# Patient Record
Sex: MALE | Race: WHITE | NOT HISPANIC OR LATINO | URBAN - METROPOLITAN AREA
[De-identification: names, ages, dates, MRNs, and addresses within clinical notes are randomized per-mention and may not be internally consistent; named-entity substitution may affect disease eponyms.]

---

## 2021-02-24 ENCOUNTER — EMERGENCY (EMERGENCY)
Facility: HOSPITAL | Age: 46
LOS: 1 days | Discharge: HOME | End: 2021-02-24
Attending: EMERGENCY MEDICINE | Admitting: EMERGENCY MEDICINE
Payer: COMMERCIAL

## 2021-02-24 VITALS
HEART RATE: 58 BPM | SYSTOLIC BLOOD PRESSURE: 126 MMHG | TEMPERATURE: 98 F | RESPIRATION RATE: 17 BRPM | OXYGEN SATURATION: 95 % | DIASTOLIC BLOOD PRESSURE: 71 MMHG

## 2021-02-24 VITALS
HEART RATE: 112 BPM | TEMPERATURE: 98 F | RESPIRATION RATE: 16 BRPM | SYSTOLIC BLOOD PRESSURE: 140 MMHG | DIASTOLIC BLOOD PRESSURE: 77 MMHG | OXYGEN SATURATION: 98 %

## 2021-02-24 PROCEDURE — 96374 THER/PROPH/DIAG INJ IV PUSH: CPT

## 2021-02-24 PROCEDURE — 73080 X-RAY EXAM OF ELBOW: CPT

## 2021-02-24 PROCEDURE — 73060 X-RAY EXAM OF HUMERUS: CPT

## 2021-02-24 PROCEDURE — 73030 X-RAY EXAM OF SHOULDER: CPT

## 2021-02-24 PROCEDURE — 99285 EMERGENCY DEPT VISIT HI MDM: CPT | Mod: 25

## 2021-02-24 PROCEDURE — 99284 EMERGENCY DEPT VISIT MOD MDM: CPT

## 2021-02-24 PROCEDURE — 73030 X-RAY EXAM OF SHOULDER: CPT | Mod: 26,RT,76

## 2021-02-24 PROCEDURE — 23620 CLTX GR HMRL TBRS FX WO MNPJ: CPT | Mod: RT

## 2021-02-24 PROCEDURE — 73080 X-RAY EXAM OF ELBOW: CPT | Mod: 26,RT

## 2021-02-24 PROCEDURE — 73060 X-RAY EXAM OF HUMERUS: CPT | Mod: 26,RT

## 2021-02-24 RX ORDER — HYDROMORPHONE HYDROCHLORIDE 2 MG/ML
0.5 INJECTION INTRAMUSCULAR; INTRAVENOUS; SUBCUTANEOUS ONCE
Refills: 0 | Status: DISCONTINUED | OUTPATIENT
Start: 2021-02-24 | End: 2021-02-24

## 2021-02-24 RX ADMIN — HYDROMORPHONE HYDROCHLORIDE 0.5 MILLIGRAM(S): 2 INJECTION INTRAMUSCULAR; INTRAVENOUS; SUBCUTANEOUS at 12:41

## 2021-02-24 NOTE — CONSULT NOTE ADULT - ASSESSMENT
A/P: 46M w/ R Shoulder Fracture Dislocation  s/p Closed reduction  Analgesia  NWB RUE  Sling for comfort  PT/OT  Ice as tolerated  No acute orthopedic surgical intervention indicated at this time  Orthopedically stable  FU outpatient with orthopedics, Dr. Hays within 1 week. Call office to schedule appointment.  Discussed with orthopedic attending, Dr. Hays who is aware and in agreement with above plan

## 2021-02-24 NOTE — ED PROVIDER NOTE - PHYSICAL EXAMINATION
Constitutional: Awake, Alert, non-toxic. NAD. Well appearing, well nourished.   HEAD: Normocephalic, atraumatic.   EYES: PERRL, EOM intact, conjunctiva and sclera are clear bilaterally. No raccoon eyes.   ENT: No victor sign. No rhinorrhea, normal pharynx, patent, no tonsillar exudate or enlargement, mucous membranes pink/moist, no erythema, no drooling or stridor.   NECK: Supple, non-tender  BACK: No midline or paraspinal TTP of cervical/thoracic/lumbar spine, FROM. No ecchymosis or hematomas.   CARDIOVASCULAR: Normal S1, S2; regular rate and rhythm.  RESPIRATORY: Normal respiratory effort; breath sounds CTAB, no wheezes, rhonchi, or rales. Speaking in full sentences. No accessory muscle use.   ABDOMEN: Soft; non-tender, non-distended.  EXTREMITIES: (+) left shoulder limited ROM, (+) left shoulder TTP, no wrist drop, radial pulse intact, hand  intact; distal pulses palpable and symmetric, no edema, no crepitus or step off  SKIN: Warm, dry; good skin turgor, no apparent lesions or rashes, no ecchymosis, brisk capillary refill.  NEURO: A&O x3. Sensory and motor functions are grossly intact. Speech is normal. Appearance and judgement seem appropriate for gender and age. No neurological deficits. Neurovascular sensation intact motor function 5/5 of upper and lower extremities, CN II-XII grossly intact, no ataxia, absent pronator drift, intact cerebellar function. No nystagmus. No facial droop.

## 2021-02-24 NOTE — ED PROVIDER NOTE - PATIENT PORTAL LINK FT
You can access the FollowMyHealth Patient Portal offered by [facility] by registering at the following website: [email or web address]. By joining [employer]’s FollowMyHealth portal, you will also be able to view your health information using other applications (apps) compatible with our system.

## 2021-02-24 NOTE — ED ADULT NURSE NOTE - OBJECTIVE STATEMENT
Pt received in bed alert and oriented and resting in bed with the c/o slip and fall off truck. Pt has c/o pain in right shoulder. As per MD's orders pt prepped for xrays and IV venkatesh placed in left AC. Pt stable and tolerate well. Nursing care ongoing and safety maintained.

## 2021-02-24 NOTE — ED PROVIDER NOTE - CARE PROVIDER_API CALL
Cayetano Hays (DO)  Orthopaedic Surgery Surgery  [address] [facility], [address] [place] [address]  Phone: [phone]  Fax: [phone]  Follow Up Time: 1-3 Days

## 2021-02-24 NOTE — CONSULT NOTE ADULT - SUBJECTIVE AND OBJECTIVE BOX
46yMale RHD community ambulator without assistive devices who presents to [facility] w/ a c/o R shoulder pain  s/p mechanical fall at work. Pt states he used his R arm to catch his fall when he felt a pop and severe pain in his R shoulder Patient denies head hit or LOC. Patient admits to numbness/tingling in his 4th and 5th fingers but states this is chronic. Patient denies any other numbness or tingling in the RUE. Patient denies any other injuries. Denies any previous orthopedic history.    ROS: 10 point review of systems otherwise negative unless noted in HPI    PMH:  No pertinent past medical history      PSH:  No significant past surgical history      AH:    Meds: See med rec    T(C): 36.7 (02-24-21 @ 10:30)  HR: 112 (02-24-21 @ 10:30)  BP: 140/77 (02-24-21 @ 10:30)  RR: 16 (02-24-21 @ 10:30)  SpO2: 98% (02-24-21 @ 10:30)  Wt(kg): --    Gen: NAD  Resp: Unlabored breathing  PE RUE:  Skin intact, + sulcus sign,   SILT radial/median/ulnar/axillary  +AIN/PIN/Ulnar/Radial/Musc/Median,   +elbow/wrist ROM,   shoulder ROM limited 2/2 pain,   +radial pulse, soft compartments.    Secondary Survey:   RLE/LLE/LUE: No TTP over bony prominences, SILT, palpable pulses, full/painless range of motion, compartments soft    Spine: No bony tenderness. No palpable stepoffs.     Imaging:  XR demonstrating R anterior shoulder dislocation with minimally displaced greater tuberosity fracture    Procedure:  Under aspetic conditions, 10cc 1% lidocaine mixed with 10cc of sterile water was injected into the affected shoulder joint. Closed reduction was then performed and the affected extremity was immobilized. The patient tolerated the procedure well and there were no complications. The patient was neurovascularly intact following reduction. Post-reduction xrays demonstrated a reduced shoulder.

## 2021-02-24 NOTE — ED PROVIDER NOTE - NSFOLLOWUPINSTRUCTIONS_ED_ALL_ED_FT
1) Follow-up with Orthopedics, See referred doctor. Call today/next business day for close, prompt follow-up.  2) Return to Emergency room for any worsening or persistent pain, weakness, numbness, fever, color change to extremity, or any other concerning symptoms.  3) Take ibuprofen 600 mg every  6 hours as needed.   4) You can consider discussing with your doctor if physical therapy or further imaging as an MRI may be beneficial.     Shoulder Dislocation    WHAT YOU NEED TO KNOW:    A shoulder dislocation happens when the top of your arm bone (humerus) moves out of the socket in your shoulder blade.    Shoulder Anatomy         DISCHARGE INSTRUCTIONS:    Seek care immediately if:   •Your shoulder and arm become pale or cold.      •You cannot move your shoulder and arm.      •You have more redness or swelling in your shoulder.      •Your shoulder becomes dislocated again.      Call your doctor if:   •You have a fever.      •You have more pain in your shoulder and arm even after you rest and take your medicine.      •You have new weakness or numbness in your shoulder and arm.      •You have questions or concerns about your condition or care.      Medicines: You may need any of the following:   •Prescription pain medicine may be given. Ask your healthcare provider how to take this medicine safely. Some prescription pain medicines contain acetaminophen. Do not take other medicines that contain acetaminophen without talking to your healthcare provider. Too much acetaminophen may cause liver damage. Prescription pain medicine may cause constipation. Ask your healthcare provider how to prevent or treat constipation.       •A muscle relaxer may help the tight muscles in your shoulder relax.      •Take your medicine as directed. Contact your healthcare provider if you think your medicine is not helping or if you have side effects. Tell him or her if you are allergic to any medicine. Keep a list of the medicines, vitamins, and herbs you take. Include the amounts, and when and why you take them. Bring the list or the pill bottles to follow-up visits. Carry your medicine list with you in case of an emergency.      Rest your shoulder and arm: Rest helps your muscles and tissues heal. Avoid activities that cause pain or use an overhead arm motion. Your healthcare provider will tell you when it is safe to return to sports or other daily activities.    How to wear a brace, sling, or splint: A brace, sling, or splint may be needed to limit your movement and protect your shoulder.    Shoulder Sling     •Wear your brace, sling, or splint all the time. Take it off only to bathe or do exercises as directed. Ask your healthcare provider how many weeks you should wear it.      •Keep your skin clean and dry. Place padding under your armpit to help absorb sweat and prevent sores on your skin.      •Do not hunch your shoulders. This may cause pain. Keep your shoulders relaxed.      •Support your wrist and hand with the sling. Cover the knuckles on your hand with your sling. Your wrist should be positioned higher than your elbow. Your wrist may start to hurt or go numb if your sling is too short.      Apply ice: Ice helps decrease swelling and pain. Ice may also help prevent tissue damage. Use an ice pack, or put crushed ice in a plastic bag. Cover it with a towel before you place it on your shoulder. Apply ice for 15 to 20 minutes every hour or as directed.    Exercises: Begin gentle exercises as directed. After healing begins, you may start light exercises so your shoulder does not get stiff. Go to physical therapy if directed. A physical therapist teaches you exercises to help improve movement and strength, and to decrease pain. Do not lift heavy objects or do any exercise that causes severe pain.    Follow up with your doctor in 5 to 10 days: Write down your questions so you remember to ask them during your visits.

## 2021-02-24 NOTE — ED PROVIDER NOTE - OBJECTIVE STATEMENT
45 y/o male without reported PMHX presents today due to right shoulder pain due to slip and fall at work at 8:30am today. pt describes pain as aching, worse with movement, and currently 10/10. pt reports he took 4 Advil for pain. pt reports concern for dislocation. pt denies headache, head injury, blood thinner use, numbness/weakness, fever, prodromal symptoms, open wounds, or any other complaints.

## 2021-02-24 NOTE — ED PROVIDER NOTE - ATTENDING CONTRIBUTION TO CARE
45 yo M p/w trip and fall, hit arm on top of a vehicle. pt co pain to R shoulder - no hx prior inj. No cp/sob/palp. no head inj / loc. No neck /back pain. no agg/allev factors. No other inj or co.  exam: MM Moist. neck supple. no ext signs of head trauma. no spinal tend (c,t,l) . R shoulder with palpable deformity at glenoid. dec rom due to pain. nl dist str/sens equal bl, 2+ pulses. nl cap refill. No other acute findings.  XR with disloc and fx - Ortho seen pt and reduced - for otpt fu

## 2024-07-20 NOTE — ED PROVIDER NOTE - CLINICAL SUMMARY MEDICAL DECISION MAKING FREE TEXT BOX
Cardiac enzymes went up slightly.  Delta was slightly elevated.   NAVEED score 0  Monitor cardiac enzymes  Appears to be more musculoskeletal on examination  Can check echo  Depending on symptoms tomorrow and troponins can consider further inpatient ischemic workup versus outpatient if necessary.  Hold off on cardiac consultation for now   presents today due to right shoulder pain due to slip and fall at work at 8:30am today. pt reports concern for dislocation. plan includes xray r/o fx/dislocation, re-assess
